# Patient Record
Sex: FEMALE | Race: WHITE | NOT HISPANIC OR LATINO | Employment: OTHER | URBAN - METROPOLITAN AREA
[De-identification: names, ages, dates, MRNs, and addresses within clinical notes are randomized per-mention and may not be internally consistent; named-entity substitution may affect disease eponyms.]

---

## 2017-06-06 ENCOUNTER — OFFICE VISIT (OUTPATIENT)
Dept: ORTHOPEDIC SURGERY | Facility: CLINIC | Age: 72
End: 2017-06-06

## 2017-06-06 VITALS — BODY MASS INDEX: 34.72 KG/M2 | HEIGHT: 67 IN | TEMPERATURE: 98.3 F | WEIGHT: 221.2 LBS

## 2017-06-06 DIAGNOSIS — I87.2 VENOUS INSUFFICIENCY OF BOTH LOWER EXTREMITIES: ICD-10-CM

## 2017-06-06 DIAGNOSIS — M17.12 PRIMARY OSTEOARTHRITIS OF LEFT KNEE: ICD-10-CM

## 2017-06-06 DIAGNOSIS — G89.29 CHRONIC PAIN OF LEFT KNEE: Primary | ICD-10-CM

## 2017-06-06 DIAGNOSIS — M17.11 OSTEOARTHROSIS, LOCALIZED, PRIMARY, KNEE, RIGHT: ICD-10-CM

## 2017-06-06 DIAGNOSIS — M25.562 CHRONIC PAIN OF LEFT KNEE: Primary | ICD-10-CM

## 2017-06-06 PROBLEM — M17.10 OSTEOARTHROSIS, LOCALIZED, PRIMARY, KNEE: Status: ACTIVE | Noted: 2017-06-06

## 2017-06-06 PROCEDURE — 73564 X-RAY EXAM KNEE 4 OR MORE: CPT | Performed by: ORTHOPAEDIC SURGERY

## 2017-06-06 PROCEDURE — 99203 OFFICE O/P NEW LOW 30 MIN: CPT | Performed by: ORTHOPAEDIC SURGERY

## 2017-06-06 PROCEDURE — 20610 DRAIN/INJ JOINT/BURSA W/O US: CPT | Performed by: ORTHOPAEDIC SURGERY

## 2017-06-06 RX ORDER — HYDROCHLOROTHIAZIDE 50 MG/1
TABLET ORAL
Refills: 0 | COMMUNITY
Start: 2017-03-17

## 2017-06-06 RX ORDER — METHYLPREDNISOLONE ACETATE 80 MG/ML
80 INJECTION, SUSPENSION INTRA-ARTICULAR; INTRALESIONAL; INTRAMUSCULAR; SOFT TISSUE
Status: COMPLETED | OUTPATIENT
Start: 2017-06-06 | End: 2017-06-06

## 2017-06-06 RX ORDER — LIDOCAINE HYDROCHLORIDE 10 MG/ML
3 INJECTION, SOLUTION INFILTRATION; PERINEURAL
Status: COMPLETED | OUTPATIENT
Start: 2017-06-06 | End: 2017-06-06

## 2017-06-06 RX ORDER — MELOXICAM 15 MG/1
TABLET ORAL
Refills: 1 | COMMUNITY
Start: 2017-04-30

## 2017-06-06 RX ORDER — BUPIVACAINE HYDROCHLORIDE 5 MG/ML
3 INJECTION, SOLUTION PERINEURAL
Status: COMPLETED | OUTPATIENT
Start: 2017-06-06 | End: 2017-06-06

## 2017-06-06 RX ORDER — FLUOXETINE HYDROCHLORIDE 40 MG/1
CAPSULE ORAL
Refills: 2 | COMMUNITY
Start: 2017-03-17

## 2017-06-06 RX ORDER — ATORVASTATIN CALCIUM 40 MG/1
TABLET, FILM COATED ORAL
Refills: 0 | COMMUNITY
Start: 2017-03-17

## 2017-06-06 RX ADMIN — LIDOCAINE HYDROCHLORIDE 3 ML: 10 INJECTION, SOLUTION INFILTRATION; PERINEURAL at 11:55

## 2017-06-06 RX ADMIN — METHYLPREDNISOLONE ACETATE 80 MG: 80 INJECTION, SUSPENSION INTRA-ARTICULAR; INTRALESIONAL; INTRAMUSCULAR; SOFT TISSUE at 11:55

## 2017-06-06 RX ADMIN — BUPIVACAINE HYDROCHLORIDE 3 ML: 5 INJECTION, SOLUTION PERINEURAL at 11:55

## 2017-06-06 NOTE — PROGRESS NOTES
Patient:  Viridiana Harris is a 72 y.o. female    Chief Complaint/ Reason for Visit:    Chief Complaint   Patient presents with   • Left Knee - Establish Care, Pain       HPI:  Pain in her left knee.  This is sometimes accompanied by clicking, popping, and occasionally, grinding.  She has had arthroscopies of both knees over the years.  She says that the right knee really does not give her a lot of trouble.  She sees Dr. Larry in [place], [place], and says that every year to year and a half she receives a cortisone injection into her left knee and it lasts a year to year and a half.  She says she's had at least 4.  She does not recall whether she's had an injection in her right knee.  She says she has had Synvisc in her left knee one time but says that it did not help her symptoms at all.    The knee pain is primarily anteriorly and medially located.  It is exacerbated by walking and standing, and alleviated by rest, elevation, and the episodic injection.  She has had physical therapy for her knees, saying that she is done it for at least 3-4 months when she was living in [facility].  She has tried various Ace wraps and soft braces.  Over the years, she has tried Aleve, Advil, aspirin, Tylenol, and a couple of different prescription anti-inflammatories for her knee pain and they helped to varying degrees, though the oral medications don't seem to help much lately.  She has used over-the-counter Aspercreme with some relief of the left knee pain, she believes.      PMH:  History reviewed. No pertinent past medical history.    PSH:    Past Surgical History:   Procedure Laterality Date   • FOOT SURGERY Bilateral     Mendez's neuroma   • KNEE SURGERY      meniscus       Social Hx:    Social History     Social History   • Marital status:      Spouse name: N/A   • Number of children: N/A   • Years of education: N/A     Occupational History   • Not on file.     Social History Main Topics   • Smoking  "status: Former Smoker     Quit date: 1985   • Smokeless tobacco: Not on file   • Alcohol use No   • Drug use: No   • Sexual activity: Defer     Other Topics Concern   • Not on file     Social History Narrative   • No narrative on file       Family Hx:  History reviewed. No pertinent family history.    Meds:    Current Outpatient Prescriptions:   •  atorvastatin (LIPITOR) 40 MG tablet, TAKE 1 TABLET BY MOUTH EVERY NIGHT AT BEDTIME, Disp: , Rfl: 0  •  FLUoxetine (PROzac) 40 MG capsule, TAKE 1 CAPSULE BY MOUTH DAILY, Disp: , Rfl: 2  •  hydrochlorothiazide (HYDRODIURIL) 50 MG tablet, TAKE 1 TABLET BY MOUTH EVERY DAY, Disp: , Rfl: 0  •  meloxicam (MOBIC) 15 MG tablet, TAKE 1 TABLET BY MOUTH EVERY DAY, Disp: , Rfl: 1    Allergies:  No Known Allergies    ROS:  Review of Systems   Constitutional: Negative for chills, fever and unexpected weight change.   HENT: Negative for trouble swallowing and voice change.    Eyes: Negative for visual disturbance.   Respiratory: Negative for cough and shortness of breath.    Cardiovascular: Negative for chest pain and leg swelling.   Gastrointestinal: Negative for abdominal pain, nausea and vomiting.   Endocrine: Negative for cold intolerance and heat intolerance.   Genitourinary: Negative for difficulty urinating, frequency and urgency.   Musculoskeletal: Positive for arthralgias.   Skin: Negative for rash and wound.   Allergic/Immunologic: Negative for immunocompromised state.   Neurological: Negative for weakness and numbness.   Hematological: Does not bruise/bleed easily.   Psychiatric/Behavioral: Negative for dysphoric mood. The patient is not nervous/anxious.        Vitals:    06/06/17 1000   Temp: 98.3 °F (36.8 °C)   TempSrc: Temporal Artery    Weight: 221 lb 3.2 oz (100 kg)   Height: 66.5\" (168.9 cm)   Body mass index is 35.17 kg/(m^2).      Physical Exam    The patient is awake, alert, and oriented ×3.  The patient is in no acute distress.  Breathing is regular and unlabored " with a respiratory rate of 12/m.  Extraocular movements and pupillary responses are symmetrically intact. Sclerae are anicteric.   Hearing is within normal limits.  Speech is within normal limits.  There is no jugular venous distention.    She has somewhat of a lumbering gait with mild antalgia from the left.  Range of motion of the left knee is from 7° to 110° of flexion.  Range of motion the right knee is 8° to 114° of fracture.    Left knee: Left knee is stable in all planes.  She has a small well-healed scars consistent with arthroscopy portals.  She has crepitus on patella grind, tendency towards varus malalignment, some medial instability in the left knee on valgus stressing.  She has tenderness along the medial joint line and crepitus as well as some osteophyte ridging noted.    Right knee patient has minimal tenderness along medial joint line.  She has crepitus on range of motion to mild degree.  She has no abnormal warmth or discoloration in the right knee.    Both calves are soft and nontender with no venous cord.  She has no muscular atrophy or asymmetry.  Pedal pulses are intact regular and symmetrical in both feet with a current heart rate of about 72 bpm.  Skin and nails are unremarkable.  However, the patient does have diffuse prominent veins in both lower extremities consistent with chronic venous insufficiency and varicosities.  She has no tendency towards ulceration and has no tenderness of any of these venous structures that I can elicit on palpation.        Radiology:X-rays: A 4 view arthritis series of the left knee was ordered and reviewed today to assess patient's complaints of chronic left knee pain.  Comparison images were not available.  Incidental views the right knee were also noted.  I reviewed these images with the patient personally.  The patient has advanced tricompartmental Nupur arthritis of both knees, slightly more pronounced in the left knee than the right with bone-on-bone  "changes in the medial compartments, and osteophyte formation around all 3 compartments with joint line narrowing.        Assessment:  1. Chronic pain of left knee    - XR Knee 4+ View Left  - Large Joint Arthrocentesis    2. Primary osteoarthritis of left knee    - Large Joint Arthrocentesis    3. Osteoarthrosis, localized, primary, knee, right      4. Venous insufficiency of both lower extremities            Plan:  I had a long discussion with the patient regarding her current circumstances.  She and I reviewed her x-rays together.  We will over the treatment that she has had to date, and the treatment options ongoing.  After discussion of the options.  The patient requested a \"cortisone shot\" today.  She says it's been at least a year if not longer since she last had one.  She says that she has an ongoing physical therapy program that she does on her own after having been in formal physical therapy and [place].  She's going to continue do her physical therapy on her own on a daily regular basis.        Large Joint Arthrocentesis  Date/Time: 6/6/2017 11:55 AM  Consent given by: patient  Timeout: Immediately prior to procedure a time out was called to verify the correct patient, procedure, equipment, support staff and site/side marked as required   Supporting Documentation  Indications: pain   Procedure Details  Location: knee - L knee  Preparation: Patient was prepped and draped in the usual sterile fashion  Needle size: 22 G  Approach: anterolateral  Medications administered: 3 mL bupivacaine; 3 mL lidocaine 1 %; 80 mg methylPREDNISolone acetate 80 MG/ML  Patient tolerance: patient tolerated the procedure well with no immediate complications                Orders Placed This Encounter   Procedures   • Large Joint Arthrocentesis     This order was created via procedure documentation   • XR Knee 4+ View Left     Order Specific Question:   Reason for Exam:     Answer:   iov lt. knee     "